# Patient Record
Sex: FEMALE | Race: WHITE | NOT HISPANIC OR LATINO | Employment: STUDENT | URBAN - METROPOLITAN AREA
[De-identification: names, ages, dates, MRNs, and addresses within clinical notes are randomized per-mention and may not be internally consistent; named-entity substitution may affect disease eponyms.]

---
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
## 2025-02-19 ENCOUNTER — HOSPITAL ENCOUNTER (OUTPATIENT)
Dept: CARDIOLOGY | Facility: HOSPITAL | Age: 20
Discharge: HOME | End: 2025-02-19
Attending: INTERNAL MEDICINE
Payer: COMMERCIAL

## 2025-02-19 VITALS — BODY MASS INDEX: 21.26 KG/M2 | WEIGHT: 120 LBS | HEIGHT: 63 IN

## 2025-02-19 DIAGNOSIS — R55 SYNCOPE, UNSPECIFIED SYNCOPE TYPE: ICD-10-CM

## 2025-02-19 PROCEDURE — 93660 TILT TABLE EVALUATION: CPT

## 2025-02-25 ENCOUNTER — TELEPHONE (OUTPATIENT)
Dept: CARDIOLOGY | Facility: CLINIC | Age: 20
End: 2025-02-25
Payer: COMMERCIAL

## 2025-03-18 ENCOUNTER — OFFICE VISIT (OUTPATIENT)
Dept: CARDIOLOGY | Facility: CLINIC | Age: 20
End: 2025-03-18
Payer: COMMERCIAL

## 2025-03-18 VITALS
OXYGEN SATURATION: 100 % | HEART RATE: 59 BPM | WEIGHT: 112.44 LBS | SYSTOLIC BLOOD PRESSURE: 90 MMHG | HEIGHT: 63 IN | BODY MASS INDEX: 19.92 KG/M2 | RESPIRATION RATE: 18 BRPM | DIASTOLIC BLOOD PRESSURE: 77 MMHG

## 2025-03-18 DIAGNOSIS — G90.A POSTURAL ORTHOSTATIC TACHYCARDIA SYNDROME (POTS): ICD-10-CM

## 2025-03-18 DIAGNOSIS — R55 SYNCOPE, UNSPECIFIED SYNCOPE TYPE: Primary | ICD-10-CM

## 2025-03-18 PROCEDURE — G2211 COMPLEX E/M VISIT ADD ON: HCPCS | Mod: S$GLB,,, | Performed by: INTERNAL MEDICINE

## 2025-03-18 PROCEDURE — 3008F BODY MASS INDEX DOCD: CPT | Mod: CPTII,S$GLB,, | Performed by: INTERNAL MEDICINE

## 2025-03-18 PROCEDURE — 3078F DIAST BP <80 MM HG: CPT | Mod: CPTII,S$GLB,, | Performed by: INTERNAL MEDICINE

## 2025-03-18 PROCEDURE — 99999 PR PBB SHADOW E&M-EST. PATIENT-LVL IV: CPT | Mod: PBBFAC,,, | Performed by: INTERNAL MEDICINE

## 2025-03-18 PROCEDURE — 3074F SYST BP LT 130 MM HG: CPT | Mod: CPTII,S$GLB,, | Performed by: INTERNAL MEDICINE

## 2025-03-18 PROCEDURE — 1159F MED LIST DOCD IN RCRD: CPT | Mod: CPTII,S$GLB,, | Performed by: INTERNAL MEDICINE

## 2025-03-18 PROCEDURE — 99214 OFFICE O/P EST MOD 30 MIN: CPT | Mod: S$GLB,,, | Performed by: INTERNAL MEDICINE

## 2025-03-18 NOTE — PROGRESS NOTES
CARDIOVASCULAR CONSULTATION    REASON FOR CONSULT:   Maryana Ordaz is a 19 y.o. female who presents for   Chief Complaint   Patient presents with    Results        Referred by:  No referring provider defined for this encounter.      HISTORY OF PRESENT ILLNESS:     History of Present Illness    CHIEF COMPLAINT:  Maryana presents today for evaluation of syncope.    SYNCOPE AND PALPITATIONS:  She reports two syncope episodes earlier this year. The first occurred at a football game while standing in the heat, and the second while waiting for a blood draw after standing for about 15 minutes. She denies any previous history of passing out. These episodes tend to occur when standing for long periods and are accompanied by palpitations. She feels her heart beating fast and like she's about to pass out during these episodes. She denies any syncope while sitting or at work. When experiencing palpitations, she sits down to alleviate symptoms.    HYDRATION:  She currently drinks 3-4 bottles of water per day, acknowledging insufficient hydration during syncopal episodes.    FAMILY HISTORY:  She reports a family history of heart attacks occurring in mid-60s. She denies any family history of sudden cardiac death, unexpected death, or syncope.    SOCIAL HISTORY:  She is a college student studying nursing.         Results for orders placed or performed during the hospital encounter of 09/26/24   EKG 12-lead    Collection Time: 09/26/24  5:18 PM   Result Value Ref Range    QRS Duration 74 ms    [facility] QTC Calculation 438 ms    Narrative    Test Reason : R55,    Vent. Rate : 079 BPM     Atrial Rate : 079 BPM     P-R Int : 124 ms          QRS Dur : 074 ms      QT Int : 382 ms       P-R-T Axes : 079 082 062 degrees     QTc Int : 438 ms    Normal sinus rhythm  Normal ECG  When compared with ECG of 25-SEP-2024 13:14,  No significant change was found  Confirmed by Yecenia VALERIO, Juni SARAH ([id number]) on 9/27/2024 10:15:45 AM    Referred By:  AAAREFERR   SELF           Confirmed By:Juni Keene MD       March 25:  CHIEF COMPLAINT:  Maryana presents today for follow-up of chest pain and palpitations.    CHEST PAIN AND PALPITATIONS:  She experiences sharp chest pain during periods of severe anxiety, particularly while driving. Chest pain also occurs post-exercise, specifically after using the treadmill and stairmaster. She reports intermittent palpitations associated with stress, notably while navigating during driving. She denies experiencing palpitations on a daily or regular basis.    POTS AND ASSOCIATED SYMPTOMS:  She experiences dizziness upon standing that has not improved. She reports improvement in symptoms with compression stockings, noting increased standing tolerance and decreased dizziness when wearing them, though she does not wear them daily. She maintains adequate hydration with 48-64 ounces of water daily.    EXERCISE HISTORY:  She currently exercises at the gym using the treadmill and stairmaster. She has an 11-year history of dancing.   CHIEF COMPLAINT:  Maryana presents today for follow-up of chest pain and palpitations.    CHEST PAIN AND PALPITATIONS:  She experiences sharp chest pain during periods of severe anxiety, particularly while driving. Chest pain also occurs post-exercise, specifically after using the treadmill and stairmaster. She reports intermittent palpitations associated with stress, notably while navigating during driving. She denies experiencing palpitations on a daily or regular basis.    POTS AND ASSOCIATED SYMPTOMS:  She experiences dizziness upon standing that has not improved. She reports improvement in symptoms with compression stockings, noting increased standing tolerance and decreased dizziness when wearing them, though she does not wear them daily.  She tries to keep well hydrated    EXERCISE HISTORY:  She currently exercises at the gym using the treadmill and stairmaster. She has an 11-year history of dancing.              Tilt table test is positive for Postral orthostatic tachycardia syndrome asssociated with orthostatic hypotension      Results for orders placed during the hospital encounter of 12/13/24    Echo    Interpretation Summary    Left Ventricle: The left ventricle is normal in size. Normal wall thickness. There is normal systolic function with a visually estimated ejection fraction of 55 - 60%. There is normal diastolic function.    Right Ventricle: Normal right ventricular cavity size. Systolic function is normal.    Pulmonary Artery: The estimated pulmonary artery systolic pressure is 17 mmHg.    IVC/SVC: Normal venous pressure at 3 mmHg.    PAST MEDICAL HISTORY:     Past Medical History:   Diagnosis Date    Anxiety     Therapy        PAST SURGICAL HISTORY:     Past Surgical History:   Procedure Laterality Date    peritonsillar abscess      age 9 yr .   drained in OR.            SOCIAL HISTORY:     Social History     Socioeconomic History    Marital status: Single    Number of children: 0   Tobacco Use    Smoking status: Some Days     Types: Vaping with nicotine    Smokeless tobacco: Never    Tobacco comments:     no smokers   Substance and Sexual Activity    Alcohol use: Never    Drug use: Never    Sexual activity: Never   Social History Narrative    Lives at home with mother.     No pets.         Mother (Beronica) works at [employer] (and [employer] at [employer])       FAMILY HISTORY:     Family History   Problem Relation Name Age of Onset    Vitamin D deficiency Mother      Anxiety disorder Mother      Depression Father      Suicide Father      Hypertension Maternal Grandmother      Diabetes Maternal Grandmother      Heart disease Maternal Grandmother      Alzheimer's disease Maternal Grandmother      Hypertension Maternal Grandfather         REVIEW OF SYSTEMS:   Review of Systems   Constitutional: Negative.   HENT: Negative.     Eyes: Negative.    Cardiovascular:  Positive for palpitations.  "  Respiratory: Negative.     Endocrine: Negative.    Hematologic/Lymphatic: Negative.    Skin: Negative.    Musculoskeletal: Negative.    Gastrointestinal: Negative.    Genitourinary: Negative.    Neurological: Negative.    Psychiatric/Behavioral: Negative.     Allergic/Immunologic: Negative.        A 10 point review of systems was performed and all the pertinent positives have been mentioned. Rest of review of systems was negative.        PHYSICAL EXAM:     Vitals:    03/18/25 1112   BP: 90/77   Pulse: (!) 59   Resp: 18    Body mass index is 19.92 kg/m².  Weight: 51 kg (112 lb 7 oz)   Height: 5' 3" (160 cm)     Physical Exam  Constitutional:       Appearance: Normal appearance. She is well-developed.   HENT:      Head: Normocephalic.   Eyes:      Pupils: Pupils are equal, round, and reactive to light.   Cardiovascular:      Rate and Rhythm: Normal rate and regular rhythm.   Pulmonary:      Effort: Pulmonary effort is normal.      Breath sounds: Normal breath sounds.   Abdominal:      General: Bowel sounds are normal.      Palpations: Abdomen is soft.      Tenderness: There is no abdominal tenderness.   Musculoskeletal:         General: Normal range of motion.      Cervical back: Normal range of motion and neck supple.   Skin:     General: Skin is warm.   Neurological:      Mental Status: She is alert and oriented to person, place, and time.       Orthostatics checked and patient was orthostatic on examination with significant drop in blood pressure on standing up      Physical Exam              DATA:     Laboratory:  CBC:  Recent Labs   Lab 03/21/23  0424 09/25/24  1313 09/26/24  1732   WBC 10.57 13.93 H 10.85   Hemoglobin 11.0 L 12.2 11.9 L   Hematocrit 33.6 L 36.8 L 36.2 L   Platelets 310 442 343       CHEMISTRIES:  Recent Labs   Lab 03/21/23  0424 09/25/24  1313 09/26/24  1742   Glucose 116 H 123 H 112 H   Sodium 137 138 138   Potassium 3.7 3.3 L 3.9   BUN 6 8 7   Creatinine 0.7 0.8 0.8   Calcium 8.9 10.1 9.1 " "  Magnesium  --   --  1.8       CARDIAC BIOMARKERS:  Recent Labs   Lab 09/26/24  1732   Troponin I <0.006       COAGS:        LIPIDS/LFTS:  Recent Labs   Lab 03/21/23  0424 09/25/24  1313 09/26/24  1742   AST 16 15 16   ALT 9 L 12 13       Hemoglobin A1C   Date Value Ref Range Status   09/26/2024 5.1 4.0 - 5.6 % Final     Comment:     ADA Screening Guidelines:  5.7-6.4%  Consistent with prediabetes  >or=6.5%  Consistent with diabetes    High levels of fetal hemoglobin interfere with the HbA1C  assay. Heterozygous hemoglobin variants (HbS, HgC, etc)do  not significantly interfere with this assay.   However, presence of multiple variants may affect accuracy.         TSH  Recent Labs   Lab 09/26/24  1732   TSH 0.482       The ASCVD Risk score (Efrem JULIAN, et al., 2019) failed to calculate for the following reasons:    The 2019 ASCVD risk score is only valid for ages 40 to 79       BNP    No results found for: "BNP"      ASSESSMENT AND PLAN     Patient Active Problem List   Diagnosis    Vitamin D insufficiency    Current moderate episode of major depressive disorder without prior episode    Syncope           ALLERGIES AND MEDICATION:     Review of patient's allergies indicates:   Allergen Reactions    Penicillins Rash        Medication List            Accurate as of March 18, 2025  2:03 PM. If you have any questions, ask your nurse or doctor.                CONTINUE taking these medications      clindamycin 300 MG capsule  Commonly known as: CLEOCIN  Take 1 capsule (300 mg total) by mouth 2 (two) times daily.     ENSKYCE 0.15-0.03 mg per tablet  Generic drug: desogestreL-ethinyl estradioL  TAKE 1 TABLET BY MOUTH EVERY DAY     FLUoxetine 20 MG capsule  TAKE 1 CAPSULE BY MOUTH EVERY DAY              Orders Placed This Encounter   Procedures    Holter monitor - 48 hour       Assessment & Plan      Assessment & Plan    IMPRESSION:  Confirmed POTS diagnosis based on positive tilt table test.  Recommend conservative management " with lifestyle modifications before considering medication.    PLAN SUMMARY:  - Increase daily water intake to 60-80 oz  - Wear knee-high compression stockings daily  - Increase salt intake in diet  - Ordered 48-hour Holter monitor for palpitations evaluation  - Contact office if symptoms worsen before scheduled appointment  - Follow up in 3 months    POSTURAL ORTHOSTATIC TACHYCARDIA SYNDROME (POTS):  - Explained POTS pathophysiology  - Increase daily water intake to 60-80 oz, wear knee-high compression stockings daily, especially during work and prolonged standing, increase salt intake in diet.  -if no improvement in symptoms despite these conservative measures, initiate midodrine.    PALPITATIONS:  - Ordered 48-hour Holter monitor to further evaluate palpitations.  - Results will be sent to patient.    FOLLOW-UP:  - Follow up in 3 months.  - Contact office for earlier appointment if concerned about symptoms before scheduled follow-up.         Thank you very much for involving me in the care of your patient.  Please do not hesitate to contact me if there are any questions.      Anai Horton MD, FAC, [facility]  Interventional Cardiologist, [facility].           This note was dictated with the help of speech recognition software.  There might be un-intended errors and/or substitutions.    This note was generated with the assistance of ambient listening technology. Verbal consent was obtained by the patient and accompanying visitor(s) for the recording of patient appointment to facilitate this note. I attest to having reviewed and edited the generated note for accuracy, though some syntax or spelling errors may persist. Please contact the author of this note for any clarification.

## 2025-04-28 DIAGNOSIS — N92.6 IRREGULAR MENSES: ICD-10-CM

## 2025-04-28 DIAGNOSIS — N94.6 DYSMENORRHEA: ICD-10-CM

## 2025-04-28 RX ORDER — DESOGESTREL AND ETHINYL ESTRADIOL 0.15-0.03
1 KIT ORAL DAILY
Qty: 84 TABLET | Refills: 0 | Status: SHIPPED | OUTPATIENT
Start: 2025-04-28

## 2025-07-31 DIAGNOSIS — N92.6 IRREGULAR MENSES: ICD-10-CM

## 2025-07-31 DIAGNOSIS — N94.6 DYSMENORRHEA: ICD-10-CM

## 2025-07-31 NOTE — PROGRESS NOTES
"  [facility] Progress Note  8/5/2025    This was a virtual visit conducted via webcam.      Reason for Visit:      would like to discuss insomnia  Time spent on visit today: 15 minutes    History of Present Illness:       Patient presents today with insomnia.    Insomnia  She reports ongoing sleep difficulties for the past 2-3 months with significant challenges both falling asleep and maintaining sleep throughout the night. She describes feeling physically tired but experiencing mental hyperactivity that prevents sleep, noting her brain feels "on 10" despite her body feeling exhausted. She has tried OTC interventions including melatonin and Unisom without success and denies prior use of prescription sleep medications.    Anxiety/depression  She reports worsening depression over the past 2-3 months since discontinuing fluoxetine, while anxiety symptoms have remained unchanged. She describes previous antidepressant experiences as causing significant side effects including feeling sluggish, down, and not feeling like herself. She expresses interest in exploring medication options to address current depressive symptoms while being cautious about potential negative side effects.    Allergic rhinitis  She reports severe allergy symptoms during current allergy season with significant impact on quality of life. She currently takes Zyrtec and Claritin with limited effectiveness, noting Zyrtec provides slightly better relief for approximately 2-3 hours before wearing off. She uses Flonase intermittently when symptoms are severe. She experiences significant nasal congestion with facial soreness, particularly upon waking, and difficulty breathing through the nose resulting in mouth breathing at night. These symptoms contribute to low energy and reduced motivation.    She has been on birth control for 2-3 years but has been out of medication for 2 weeks after being unable to refill her prescription through her " gyn. She continues to have regular menstrual cycles while on birth control and is experiencing hormonal fluctuations attributed to being off medication. She expresses need to restart birth control.            Problem List:     Problem List:  2025-03: Postural orthostatic tachycardia syndrome (POTS)  2024-12: Syncope  2024-06: Current moderate episode of major depressive disorder   without prior episode  2022-01: Vitamin D insufficiency        Medications:     Current Medications[1]      Review of Systems:     Review of Systems   Constitutional:  Negative for activity change and unexpected weight change.   HENT:  Positive for rhinorrhea. Negative for hearing loss and trouble swallowing.    Eyes:  Negative for discharge and visual disturbance.   Respiratory:  Positive for wheezing. Negative for chest tightness.    Cardiovascular:  Negative for chest pain and palpitations.   Gastrointestinal:  Negative for blood in stool, constipation, diarrhea and vomiting.   Endocrine: Negative for polydipsia and polyuria.   Genitourinary:  Negative for difficulty urinating, dysuria, hematuria and menstrual problem.   Musculoskeletal:  Positive for neck pain. Negative for arthralgias and joint swelling.   Neurological:  Negative for weakness and headaches.   Psychiatric/Behavioral:  Positive for dysphoric mood. Negative for confusion.      Physical Exam:     Pt is alert and oriented.  Speech is clear and coherent.  Speaking in complete sentences.  No distress.  Mood and affect are normal.      Assessment and Plan:     Visit Summary:  Considered referral to allergist if nasal congestion and associated symptoms do not improve with current treatment plan.    - Prescribed trazodone 50 mg at bedtime for sleep and depression  - Started Astelin (nasal antihistamine spray) for allergy symptoms  - Recommend regular use of Flonase (nasal steroid spray)  - Refilled birth control pills  - Trazodone dosage may be increased to 100 mg or 150 mg if  needed  - Discussed immunotherapy as a long-term option for allergies  - Patient to contact office through [facility] for medication issues or if maximum trazodone dose is ineffective         1. Insomnia, unspecified type  2. Current moderate episode of major depressive disorder without prior episode  - Evaluated patient's depression which has worsened since stopping fluoxetine, with associated sleep difficulties and low energy.  - Anxiety levels remain unchanged.  - Discussed the impact of medication discontinuation and treatment options.  - Prescribed trazodone 50 mg at bedtime for both sleep and depression.  - Instructed patient that dosage may be increased to 100 mg (2 pills) or 150 mg (3 pills) if needed for improved sleep.  - Patient reports difficulty sleeping for the past 2-3 months with failed trials of melatonin and OTC sleep aids like Unisom.  - Insomnia may be related to worsened depression following fluoxetine discontinuation.  - Prescribed trazodone 50 mg to be taken 30 minutes before bedtime.  - Patient to contact office if maximum dose of 150 mg is ineffective.    2. Allergic rhinitis  - Started Astelin (nasal antihistamine spray) to complement current oral antihistamine regimen for persistent allergy symptoms.  - Recommend regular use of Flonase (nasal steroid spray) for more effective symptom management rather than intermittent use.  - Discussed immunotherapy as a more effective long-term option, though it requires significant time commitment.    3. Irregular menses/ Contraceptive management  - desogestreL-ethinyl estradioL (ENSKYCE) 0.15-0.03 mg per tablet; Take 1 tablet by mouth once daily.  Dispense: 84 tablet; Refill: 0    - Refilled birth control pills.       Mina Cedillo MD  8/5/2025    This note was prepared using voice-recognition software.  Although efforts are made to proofread the note, some errors may persist in the final document.    Dr. Cedillo's [place] License number is [id number]  This  was a virtual (audio/video visit) in lieu of in-person visit in context of the coronavirus emergency.      Patient/Family members identity was confirmed, and confidentiality/privacy confirmed prior to visit. Verbal informed consent was obtained from the patient's legal guardian or patient when appropriate to conduct this virtual visit.  They authorized me to provide medical care and voiced understanding of the risks, benefits, and alternatives of virtual care.  Guardian understands the limitations inherent of a virtual visit, that they may choose to be seen in person if desired or needed, and that they may halt the virtual visit at any time for any reason.     Originating Site: Patient's home   Distant Site:  [facility]     I certify that this visit was done via secure two-way simultaneous audio and video transmission with informed consent of the patient and/or guardian. Over 50% of the time was counseling or coordinating care.         [1]   Current Outpatient Medications:     azelastine (ASTELIN) 137 mcg (0.1 %) nasal spray, 1 spray (137 mcg total) by Nasal route 2 (two) times daily., Disp: 30 mL, Rfl: 5    clindamycin (CLEOCIN) 300 MG capsule, Take 1 capsule (300 mg total) by mouth 2 (two) times daily. (Patient not taking: Reported on 10/1/2024), Disp: 10 capsule, Rfl: 0    desogestreL-ethinyl estradioL (ENSKYCE) 0.15-0.03 mg per tablet, Take 1 tablet by mouth once daily., Disp: 84 tablet, Rfl: 0    FLUoxetine 20 MG capsule, TAKE 1 CAPSULE BY MOUTH EVERY DAY (Patient not taking: Reported on 3/18/2025), Disp: 90 capsule, Rfl: 2    traZODone (DESYREL) 50 MG tablet, Take 1 tablet (50 mg total) by mouth every evening., Disp: 30 tablet, Rfl: 5

## 2025-08-01 RX ORDER — DESOGESTREL AND ETHINYL ESTRADIOL 0.15-0.03
1 KIT ORAL DAILY
Qty: 84 TABLET | Refills: 0 | OUTPATIENT
Start: 2025-08-01

## 2025-08-04 ENCOUNTER — PATIENT MESSAGE (OUTPATIENT)
Dept: PSYCHIATRY | Facility: CLINIC | Age: 20
End: 2025-08-04
Payer: COMMERCIAL

## 2025-08-04 ENCOUNTER — PATIENT MESSAGE (OUTPATIENT)
Dept: PRIMARY CARE CLINIC | Facility: CLINIC | Age: 20
End: 2025-08-04
Payer: COMMERCIAL

## 2025-08-05 ENCOUNTER — OFFICE VISIT (OUTPATIENT)
Dept: PRIMARY CARE CLINIC | Facility: CLINIC | Age: 20
End: 2025-08-05
Payer: COMMERCIAL

## 2025-08-05 DIAGNOSIS — N92.6 IRREGULAR MENSES: ICD-10-CM

## 2025-08-05 DIAGNOSIS — N94.6 DYSMENORRHEA: ICD-10-CM

## 2025-08-05 DIAGNOSIS — G47.00 INSOMNIA, UNSPECIFIED TYPE: Primary | ICD-10-CM

## 2025-08-05 DIAGNOSIS — F32.1 CURRENT MODERATE EPISODE OF MAJOR DEPRESSIVE DISORDER WITHOUT PRIOR EPISODE: ICD-10-CM

## 2025-08-05 PROCEDURE — 98006 SYNCH AUDIO-VIDEO EST MOD 30: CPT | Mod: 95,,, | Performed by: INTERNAL MEDICINE

## 2025-08-05 RX ORDER — TRAZODONE HYDROCHLORIDE 50 MG/1
50 TABLET ORAL NIGHTLY
Qty: 30 TABLET | Refills: 5 | Status: SHIPPED | OUTPATIENT
Start: 2025-08-05 | End: 2026-08-05

## 2025-08-05 RX ORDER — DESOGESTREL AND ETHINYL ESTRADIOL 0.15-0.03
1 KIT ORAL DAILY
Qty: 84 TABLET | Refills: 0 | Status: SHIPPED | OUTPATIENT
Start: 2025-08-05

## 2025-08-05 RX ORDER — AZELASTINE 1 MG/ML
1 SPRAY, METERED NASAL 2 TIMES DAILY
Qty: 30 ML | Refills: 5 | Status: SHIPPED | OUTPATIENT
Start: 2025-08-05 | End: 2026-08-05

## 2025-09-01 RX ORDER — TRAZODONE HYDROCHLORIDE 50 MG/1
50 TABLET ORAL NIGHTLY
Qty: 90 TABLET | Refills: 3 | Status: SHIPPED | OUTPATIENT
Start: 2025-09-01